# Patient Record
Sex: FEMALE | Race: WHITE | NOT HISPANIC OR LATINO | URBAN - METROPOLITAN AREA
[De-identification: names, ages, dates, MRNs, and addresses within clinical notes are randomized per-mention and may not be internally consistent; named-entity substitution may affect disease eponyms.]

---

## 2019-02-15 ENCOUNTER — APPOINTMENT (RX ONLY)
Dept: URBAN - METROPOLITAN AREA OTHER 9 | Facility: OTHER | Age: 49
Setting detail: DERMATOLOGY
End: 2019-02-15

## 2019-02-15 DIAGNOSIS — D22 MELANOCYTIC NEVI: ICD-10-CM

## 2019-02-15 DIAGNOSIS — D18.0 HEMANGIOMA: ICD-10-CM

## 2019-02-15 DIAGNOSIS — L81.4 OTHER MELANIN HYPERPIGMENTATION: ICD-10-CM

## 2019-02-15 DIAGNOSIS — L21.8 OTHER SEBORRHEIC DERMATITIS: ICD-10-CM

## 2019-02-15 PROBLEM — L30.9 DERMATITIS, UNSPECIFIED: Status: ACTIVE | Noted: 2019-02-15

## 2019-02-15 PROBLEM — D48.5 NEOPLASM OF UNCERTAIN BEHAVIOR OF SKIN: Status: ACTIVE | Noted: 2019-02-15

## 2019-02-15 PROBLEM — D22.5 MELANOCYTIC NEVI OF TRUNK: Status: ACTIVE | Noted: 2019-02-15

## 2019-02-15 PROBLEM — C18.9 MALIGNANT NEOPLASM OF COLON, UNSPECIFIED: Status: ACTIVE | Noted: 2019-02-15

## 2019-02-15 PROCEDURE — 99203 OFFICE O/P NEW LOW 30 MIN: CPT | Mod: 25

## 2019-02-15 PROCEDURE — ? BIOPSY BY SHAVE METHOD

## 2019-02-15 PROCEDURE — 11102 TANGNTL BX SKIN SINGLE LES: CPT

## 2019-02-15 PROCEDURE — ? COUNSELING

## 2019-02-15 PROCEDURE — ? PRESCRIPTION

## 2019-02-15 PROCEDURE — 11103 TANGNTL BX SKIN EA SEP/ADDL: CPT

## 2019-02-15 PROCEDURE — ? OTHER

## 2019-02-15 RX ORDER — CICLOPIROX 10 MG/.96ML
SHAMPOO TOPICAL QOD
Qty: 1 | Refills: 2 | Status: ERX | COMMUNITY
Start: 2019-02-15

## 2019-02-15 RX ORDER — CALCIPOTRIENE AND BETAMETHASONE DIPROPIONATE 50; .5 UG/G; MG/G
SUSPENSION TOPICAL
Qty: 1 | Refills: 2 | Status: ERX | COMMUNITY
Start: 2019-02-15

## 2019-02-15 RX ADMIN — CALCIPOTRIENE AND BETAMETHASONE DIPROPIONATE: 50; .5 SUSPENSION TOPICAL at 00:00

## 2019-02-15 RX ADMIN — CICLOPIROX: 10 SHAMPOO TOPICAL at 00:00

## 2019-02-15 ASSESSMENT — LOCATION DETAILED DESCRIPTION DERM
LOCATION DETAILED: LEFT SUPERIOR MEDIAL UPPER BACK
LOCATION DETAILED: LEFT RIB CAGE
LOCATION DETAILED: RIGHT RIB CAGE
LOCATION DETAILED: RIGHT MEDIAL UPPER BACK
LOCATION DETAILED: LEFT SUPERIOR PARIETAL SCALP

## 2019-02-15 ASSESSMENT — LOCATION SIMPLE DESCRIPTION DERM
LOCATION SIMPLE: LEFT UPPER BACK
LOCATION SIMPLE: RIGHT UPPER BACK
LOCATION SIMPLE: SCALP
LOCATION SIMPLE: ABDOMEN

## 2019-02-15 ASSESSMENT — LOCATION ZONE DERM
LOCATION ZONE: TRUNK
LOCATION ZONE: SCALP

## 2019-02-15 NOTE — PROCEDURE: BIOPSY BY SHAVE METHOD
Billing Type: Third-Party Bill
Anesthesia Volume In Cc: 3
Type Of Destruction Used: Curettage
X Size Of Lesion In Cm: 0
Biopsy Type: H and E
Electrodesiccation And Curettage Text: The wound bed was treated with electrodesiccation and curettage after the biopsy was performed.
Post-Care Instructions: I reviewed with the patient in detail post-care instructions. Patient is to keep the biopsy site dry overnight, and then apply bacitracin twice daily until healed. Patient may apply hydrogen peroxide soaks to remove any crusting.
Silver Nitrate Text: The wound bed was treated with silver nitrate after the biopsy was performed.
Body Location Override (Optional - Billing Will Still Be Based On Selected Body Map Location If Applicable): right upper abdomen
Dressing: bandage
Biopsy Method: Personna blade
Hemostasis: Aluminum Chloride and Thermocautery
Notification Instructions: Patient will be notified of biopsy results. However, patient instructed to call the office if not contacted within 2 weeks.
Was A Bandage Applied: Yes
Bill For Surgical Tray: no
Consent: Written consent was obtained and risks were reviewed including but not limited to scarring, infection, bleeding, scabbing, incomplete removal, nerve damage and allergy to anesthesia.
Curettage Text: The wound bed was treated with curettage after the biopsy was performed
Lab: [id number]
Detail Level: Detailed
Anesthesia Type: 1% lidocaine with 1:200,000 epinephrine
Cryotherapy Text: The wound bed was treated with cryotherapy after the biopsy was performed.
Wound Care: Polysporin ointment
Lab Facility: [id number]
Depth Of Biopsy: dermis
Electrodesiccation Text: The wound bed was treated with electrodesiccation after the biopsy was performed.
Body Location Override (Optional - Billing Will Still Be Based On Selected Body Map Location If Applicable): left upper abdomen

## 2019-02-15 NOTE — PROCEDURE: OTHER
Other (Free Text): Patient has been previously diagnosed with psoriasis. She has never been biopsied for this condition. Explained to patient that it looks more like seb derm but treatments are similar for both diagnoses. Patient states she does not feel like it’s under control at this time. She feels she has gotten the best results with topical steroids. \\n\\nShe states she has also gotten some scales around her eyes and in her ears. Will biopsy at follow up to confirm diagnosis
Detail Level: Simple
Note Text (......Xxx Chief Complaint.): This diagnosis correlates with the

## 2019-04-29 ENCOUNTER — APPOINTMENT (RX ONLY)
Dept: URBAN - METROPOLITAN AREA OTHER 9 | Facility: OTHER | Age: 49
Setting detail: DERMATOLOGY
End: 2019-04-29

## 2019-04-29 DIAGNOSIS — L21.8 OTHER SEBORRHEIC DERMATITIS: ICD-10-CM

## 2019-04-29 DIAGNOSIS — Z87.891 PERSONAL HISTORY OF NICOTINE DEPENDENCE: ICD-10-CM

## 2019-04-29 PROBLEM — E78.5 HYPERLIPIDEMIA, UNSPECIFIED: Status: ACTIVE | Noted: 2019-04-29

## 2019-04-29 PROBLEM — L30.9 DERMATITIS, UNSPECIFIED: Status: ACTIVE | Noted: 2019-04-29

## 2019-04-29 PROBLEM — K21.9 GASTRO-ESOPHAGEAL REFLUX DISEASE WITHOUT ESOPHAGITIS: Status: ACTIVE | Noted: 2019-04-29

## 2019-04-29 PROBLEM — I10 ESSENTIAL (PRIMARY) HYPERTENSION: Status: ACTIVE | Noted: 2019-04-29

## 2019-04-29 PROCEDURE — ? PATIENT SPECIFIC COUNSELING

## 2019-04-29 PROCEDURE — ? DEFER

## 2019-04-29 PROCEDURE — ? NOTED ON EXAM BUT NOT TREATED

## 2019-04-29 PROCEDURE — ? TREATMENT REGIMEN

## 2019-04-29 PROCEDURE — ? PRESCRIPTION

## 2019-04-29 PROCEDURE — 99024 POSTOP FOLLOW-UP VISIT: CPT

## 2019-04-29 PROCEDURE — ? COUNSELING

## 2019-04-29 RX ORDER — FLUOCINONIDE 0.5 MG/ML
SOLUTION TOPICAL
Qty: 2 | Refills: 3 | Status: ERX

## 2019-04-29 ASSESSMENT — LOCATION ZONE DERM: LOCATION ZONE: SCALP

## 2019-04-29 ASSESSMENT — LOCATION SIMPLE DESCRIPTION DERM: LOCATION SIMPLE: SCALP

## 2019-04-29 ASSESSMENT — LOCATION DETAILED DESCRIPTION DERM: LOCATION DETAILED: LEFT SUPERIOR PARIETAL SCALP

## 2019-04-29 NOTE — PROCEDURE: PATIENT SPECIFIC COUNSELING
Shyann reassured the patient she will not charge for today’s visit, and that she will work with the patient’s current financial status by not charging the patient for the recommended scalp biopsy. This way the patient will only pay for a 94978 office visit code and the pathology charge. The patient verbalized understanding to all discussed.
Detail Level: Simple

## 2019-04-29 NOTE — PROCEDURE: TREATMENT REGIMEN
Initiate Treatment: Fluocinonide solution
Plan: The patient is to apply Fluocinonide solution to affected areas on her scalp twice daily for 2 weeks, then as needed for flares.
Discontinue Regimen: Loprox shampoo, Taclonex
Detail Level: Simple

## 2019-04-29 NOTE — PROCEDURE: DEFER
Procedure To Be Performed At Next Visit: Biopsy by punch method
Detail Level: Zone
Introduction Text (Please End With A Colon): The following procedure was deferred:

## 2019-06-13 RX ORDER — FLUOCINONIDE 0.5 MG/ML
SOLUTION TOPICAL
Qty: 2 | Refills: 3 | Status: ERX

## 2020-06-25 ENCOUNTER — OFFICE VISIT (OUTPATIENT)
Dept: URBAN - METROPOLITAN AREA CLINIC 96 | Facility: CLINIC | Age: 50
End: 2020-06-25
Payer: COMMERCIAL

## 2020-06-25 DIAGNOSIS — K76.0 FATTY LIVER: ICD-10-CM

## 2020-06-25 DIAGNOSIS — Z85.038 PERSONAL HISTORY OF COLON CANCER: ICD-10-CM

## 2020-06-25 DIAGNOSIS — K92.1 HEMATOCHEZIA: ICD-10-CM

## 2020-06-25 PROCEDURE — 99204 OFFICE O/P NEW MOD 45 MIN: CPT | Performed by: INTERNAL MEDICINE

## 2020-06-25 PROCEDURE — 99244 OFF/OP CNSLTJ NEW/EST MOD 40: CPT | Performed by: INTERNAL MEDICINE

## 2020-06-25 RX ORDER — AMLODIPINE BESYLATE 2.5 MG/1
1 TABLET TABLET ORAL ONCE A DAY
Status: ACTIVE | COMMUNITY

## 2020-06-25 RX ORDER — METFORMIN HYDROCHLORIDE 1000 MG/1
1 TABLET WITH A MEAL TABLET, FILM COATED ORAL ONCE A DAY
Status: ACTIVE | COMMUNITY

## 2020-06-25 RX ORDER — SODIUM, POTASSIUM,MAG SULFATES 17.5-3.13G
177ML SOLUTION, RECONSTITUTED, ORAL ORAL
Qty: 177 MILLILITER | Refills: 0 | OUTPATIENT
Start: 2020-06-25 | End: 2020-06-26

## 2020-06-25 RX ORDER — EMPAGLIFLOZIN 25 MG/1
1 TABLET TABLET, FILM COATED ORAL ONCE A DAY
Status: ACTIVE | COMMUNITY

## 2020-06-25 NOTE — HPI-TODAY'S VISIT:
here for colonoscopy had one at [place] with Dr. Spence in 2018, normal. 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 Montesinos. no issues since then. three weeks ago developed hematochezia for a few days is a vet hx of FLD. rare etoh use occ diarrhea since her resection

## 2020-08-05 ENCOUNTER — TELEPHONE ENCOUNTER (OUTPATIENT)
Dept: URBAN - METROPOLITAN AREA CLINIC 92 | Facility: CLINIC | Age: 50
End: 2020-08-05

## 2020-08-05 ENCOUNTER — OFFICE VISIT (OUTPATIENT)
Dept: URBAN - METROPOLITAN AREA SURGERY CENTER 18 | Facility: SURGERY CENTER | Age: 50
End: 2020-08-05
Payer: COMMERCIAL

## 2020-08-05 DIAGNOSIS — D12.3 ADENOMA OF TRANSVERSE COLON: ICD-10-CM

## 2020-08-05 DIAGNOSIS — K63.89 BACTERIAL OVERGROWTH SYNDROME: ICD-10-CM

## 2020-08-05 DIAGNOSIS — Z85.038 PERSONAL HISTORY OF COLON CANCER: ICD-10-CM

## 2020-08-05 PROCEDURE — G8907 PT DOC NO EVENTS ON DISCHARG: HCPCS | Performed by: INTERNAL MEDICINE

## 2020-08-05 PROCEDURE — G9936 PMH PLYP/NEO CO/RECT/JUN/ANS: HCPCS | Performed by: INTERNAL MEDICINE

## 2020-08-05 PROCEDURE — 45380 COLONOSCOPY AND BIOPSY: CPT | Performed by: INTERNAL MEDICINE

## 2020-08-05 RX ORDER — AMLODIPINE BESYLATE 2.5 MG/1
1 TABLET TABLET ORAL ONCE A DAY
Status: ACTIVE | COMMUNITY

## 2020-08-05 RX ORDER — METFORMIN HYDROCHLORIDE 1000 MG/1
1 TABLET WITH A MEAL TABLET, FILM COATED ORAL ONCE A DAY
Status: ACTIVE | COMMUNITY

## 2020-08-05 RX ORDER — EMPAGLIFLOZIN 25 MG/1
1 TABLET TABLET, FILM COATED ORAL ONCE A DAY
Status: ACTIVE | COMMUNITY

## 2020-08-28 ENCOUNTER — OFFICE VISIT (OUTPATIENT)
Dept: URBAN - METROPOLITAN AREA TELEHEALTH 2 | Facility: TELEHEALTH | Age: 50
End: 2020-08-28

## 2020-08-28 ENCOUNTER — TELEPHONE ENCOUNTER (OUTPATIENT)
Dept: URBAN - METROPOLITAN AREA CLINIC 92 | Facility: CLINIC | Age: 50
End: 2020-08-28

## 2020-08-28 RX ORDER — METFORMIN HYDROCHLORIDE 1000 MG/1
1 TABLET WITH A MEAL TABLET, FILM COATED ORAL ONCE A DAY
Status: ACTIVE | COMMUNITY

## 2020-08-28 RX ORDER — EMPAGLIFLOZIN 25 MG/1
1 TABLET TABLET, FILM COATED ORAL ONCE A DAY
Status: ACTIVE | COMMUNITY

## 2020-08-28 RX ORDER — AMLODIPINE BESYLATE 2.5 MG/1
1 TABLET TABLET ORAL ONCE A DAY
Status: ACTIVE | COMMUNITY

## 2022-01-25 ENCOUNTER — TELEPHONE ENCOUNTER (OUTPATIENT)
Dept: URBAN - METROPOLITAN AREA CLINIC 50 | Facility: CLINIC | Age: 52
End: 2022-01-25

## 2022-04-19 ENCOUNTER — WEB ENCOUNTER (OUTPATIENT)
Dept: URBAN - METROPOLITAN AREA CLINIC 50 | Facility: CLINIC | Age: 52
End: 2022-04-19

## 2022-04-19 ENCOUNTER — OFFICE VISIT (OUTPATIENT)
Dept: URBAN - METROPOLITAN AREA CLINIC 50 | Facility: CLINIC | Age: 52
End: 2022-04-19
Payer: COMMERCIAL

## 2022-04-19 VITALS
DIASTOLIC BLOOD PRESSURE: 86 MMHG | TEMPERATURE: 97.4 F | BODY MASS INDEX: 37.73 KG/M2 | SYSTOLIC BLOOD PRESSURE: 130 MMHG | HEIGHT: 62 IN | HEART RATE: 95 BPM | WEIGHT: 205 LBS

## 2022-04-19 DIAGNOSIS — R16.1 SPLENOMEGALY: ICD-10-CM

## 2022-04-19 DIAGNOSIS — R93.3 ABNORMAL COLONOSCOPY: ICD-10-CM

## 2022-04-19 DIAGNOSIS — R93.2 ABNORMAL CT OF LIVER: ICD-10-CM

## 2022-04-19 DIAGNOSIS — K86.2 PANCREAS CYST: ICD-10-CM

## 2022-04-19 DIAGNOSIS — Z85.038 HISTORY OF COLON CANCER: ICD-10-CM

## 2022-04-19 PROCEDURE — 99214 OFFICE O/P EST MOD 30 MIN: CPT | Performed by: INTERNAL MEDICINE

## 2022-04-19 RX ORDER — AMLODIPINE BESYLATE 2.5 MG/1
1 TABLET TABLET ORAL ONCE A DAY
Status: ACTIVE | COMMUNITY

## 2022-04-19 RX ORDER — SODIUM SULFATE, MAGNESIUM SULFATE, AND POTASSIUM CHLORIDE 17.75; 2.7; 2.25 G/1; G/1; G/1
12 TABLETS TABLET ORAL
Qty: 24 TABLETS | Refills: 0 | OUTPATIENT
Start: 2022-04-19 | End: 2022-04-20

## 2022-04-19 RX ORDER — METFORMIN HYDROCHLORIDE 1000 MG/1
1 TABLET WITH A MEAL TABLET, FILM COATED ORAL ONCE A DAY
Status: ACTIVE | COMMUNITY

## 2022-04-19 RX ORDER — EMPAGLIFLOZIN 25 MG/1
1 TABLET TABLET, FILM COATED ORAL ONCE A DAY
Status: ACTIVE | COMMUNITY

## 2022-04-19 NOTE — HPI-TODAY'S VISIT:
had a colonoscopy 8/2020 with 5 mm tubular adenoma and submucosal nodule noted in cecum, bx negative follow up CT scan showed steatosis, exophytic fibroid, gallstones follow up MRI showed 7 mm panc cyst, advised repeat in 12 months  =================================== here for colonoscopy had one at [facility] with Dr. Spence in 2018, normal. 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 Montesinos. no issues since then. three weeks ago developed hematochezia for a few days is a vet hx of FLD. rare etoh use occ diarrhea since her resection

## 2022-05-13 ENCOUNTER — LAB OUTSIDE AN ENCOUNTER (OUTPATIENT)
Dept: URBAN - METROPOLITAN AREA CLINIC 96 | Facility: CLINIC | Age: 52
End: 2022-05-13

## 2022-05-13 LAB
CREATININE POC: 0.7
PERFORMING LAB: (no result)

## 2022-06-22 PROBLEM — 429699009: Status: ACTIVE | Noted: 2022-04-19

## 2022-06-24 ENCOUNTER — OFFICE VISIT (OUTPATIENT)
Dept: URBAN - METROPOLITAN AREA SURGERY CENTER 19 | Facility: SURGERY CENTER | Age: 52
End: 2022-06-24

## 2022-07-27 ENCOUNTER — OFFICE VISIT (OUTPATIENT)
Dept: URBAN - METROPOLITAN AREA SURGERY CENTER 18 | Facility: SURGERY CENTER | Age: 52
End: 2022-07-27
Payer: COMMERCIAL

## 2022-07-27 ENCOUNTER — CLAIMS CREATED FROM THE CLAIM WINDOW (OUTPATIENT)
Dept: URBAN - METROPOLITAN AREA CLINIC 4 | Facility: CLINIC | Age: 52
End: 2022-07-27
Payer: COMMERCIAL

## 2022-07-27 DIAGNOSIS — D12.3 BENIGN NEOPLASM OF TRANSVERSE COLON: ICD-10-CM

## 2022-07-27 DIAGNOSIS — D12.8 BENIGN NEOPLASM OF RECTUM: ICD-10-CM

## 2022-07-27 DIAGNOSIS — D12.5 ADENOMA OF SIGMOID COLON: ICD-10-CM

## 2022-07-27 DIAGNOSIS — D12.5 BENIGN NEOPLASM OF SIGMOID COLON: ICD-10-CM

## 2022-07-27 DIAGNOSIS — D12.3 ADENOMA OF TRANSVERSE COLON: ICD-10-CM

## 2022-07-27 DIAGNOSIS — Z85.038 H/O COLON CANCER, STAGE I: ICD-10-CM

## 2022-07-27 DIAGNOSIS — D12.8 ADENOMATOUS POLYP OF RECTUM: ICD-10-CM

## 2022-07-27 PROCEDURE — G8907 PT DOC NO EVENTS ON DISCHARG: HCPCS | Performed by: INTERNAL MEDICINE

## 2022-07-27 PROCEDURE — 45385 COLONOSCOPY W/LESION REMOVAL: CPT | Performed by: INTERNAL MEDICINE

## 2022-07-27 PROCEDURE — 88305 TISSUE EXAM BY PATHOLOGIST: CPT | Performed by: PATHOLOGY

## 2022-07-27 RX ORDER — METFORMIN HYDROCHLORIDE 1000 MG/1
1 TABLET WITH A MEAL TABLET, FILM COATED ORAL ONCE A DAY
Status: ACTIVE | COMMUNITY

## 2022-07-27 RX ORDER — EMPAGLIFLOZIN 25 MG/1
1 TABLET TABLET, FILM COATED ORAL ONCE A DAY
Status: ACTIVE | COMMUNITY

## 2022-07-27 RX ORDER — AMLODIPINE BESYLATE 2.5 MG/1
1 TABLET TABLET ORAL ONCE A DAY
Status: ACTIVE | COMMUNITY

## 2023-05-16 ENCOUNTER — OFFICE VISIT (OUTPATIENT)
Dept: URBAN - METROPOLITAN AREA CLINIC 50 | Facility: CLINIC | Age: 53
End: 2023-05-16
Payer: COMMERCIAL

## 2023-05-16 ENCOUNTER — LAB OUTSIDE AN ENCOUNTER (OUTPATIENT)
Dept: URBAN - METROPOLITAN AREA CLINIC 50 | Facility: CLINIC | Age: 53
End: 2023-05-16

## 2023-05-16 ENCOUNTER — WEB ENCOUNTER (OUTPATIENT)
Dept: URBAN - METROPOLITAN AREA CLINIC 50 | Facility: CLINIC | Age: 53
End: 2023-05-16

## 2023-05-16 VITALS
WEIGHT: 209 LBS | SYSTOLIC BLOOD PRESSURE: 130 MMHG | BODY MASS INDEX: 38.46 KG/M2 | HEART RATE: 79 BPM | DIASTOLIC BLOOD PRESSURE: 86 MMHG | HEIGHT: 62 IN | TEMPERATURE: 97.3 F

## 2023-05-16 DIAGNOSIS — K76.0 FATTY LIVER: ICD-10-CM

## 2023-05-16 DIAGNOSIS — Z85.038 HISTORY OF COLON CANCER: ICD-10-CM

## 2023-05-16 DIAGNOSIS — R93.2 ABNORMAL CT OF LIVER: ICD-10-CM

## 2023-05-16 DIAGNOSIS — R16.1 SPLENOMEGALY: ICD-10-CM

## 2023-05-16 PROCEDURE — 99214 OFFICE O/P EST MOD 30 MIN: CPT | Performed by: INTERNAL MEDICINE

## 2023-05-16 RX ORDER — SODIUM, POTASSIUM,MAG SULFATES 17.5-3.13G
354 ML SOLUTION, RECONSTITUTED, ORAL ORAL ONCE
Qty: 1 | Refills: 0 | OUTPATIENT
Start: 2023-05-17

## 2023-05-16 RX ORDER — AMLODIPINE BESYLATE 2.5 MG/1
1 TABLET TABLET ORAL ONCE A DAY
Status: ACTIVE | COMMUNITY

## 2023-05-16 RX ORDER — EMPAGLIFLOZIN 25 MG/1
1 TABLET TABLET, FILM COATED ORAL ONCE A DAY
Status: ACTIVE | COMMUNITY

## 2023-05-16 RX ORDER — METFORMIN HYDROCHLORIDE 1000 MG/1
1 TABLET WITH A MEAL TABLET, FILM COATED ORAL ONCE A DAY
Status: ACTIVE | COMMUNITY

## 2023-05-16 NOTE — HPI-TODAY'S VISIT:
colonoscopy 2022 with m ultiple tubular adenomas, largest 15 mm. has psoriasis dermatologist has had her on topicals for years affects her life all on her scalp. derm is [facility], first visit, saw an NP skyrizi or tremfiya  ========================================= had a colonoscopy 8/2020 with 5 mm tubular adenoma and submucosal nodule noted in cecum, bx negative follow up CT scan showed steatosis, exophytic fibroid, gallstones follow up MRI showed 7 mm panc cyst, advised repeat in 12 months  =================================== here for colonoscopy had one at [place] with Dr. Spence in 2018, normal. 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 Montesinos. is a vet hx of FLD. rare etoh use occ diarrhea since her resection here for colonoscopy

## 2023-06-15 ENCOUNTER — TELEPHONE ENCOUNTER (OUTPATIENT)
Dept: URBAN - METROPOLITAN AREA CLINIC 96 | Facility: CLINIC | Age: 53
End: 2023-06-15

## 2023-08-04 ENCOUNTER — TELEPHONE ENCOUNTER (OUTPATIENT)
Dept: URBAN - METROPOLITAN AREA CLINIC 50 | Facility: CLINIC | Age: 53
End: 2023-08-04

## 2023-08-10 ENCOUNTER — WEB ENCOUNTER (OUTPATIENT)
Dept: URBAN - METROPOLITAN AREA SURGERY CENTER 19 | Facility: SURGERY CENTER | Age: 53
End: 2023-08-10

## 2023-08-11 ENCOUNTER — CLAIMS CREATED FROM THE CLAIM WINDOW (OUTPATIENT)
Dept: URBAN - METROPOLITAN AREA CLINIC 4 | Facility: CLINIC | Age: 53
End: 2023-08-11
Payer: COMMERCIAL

## 2023-08-11 ENCOUNTER — OFFICE VISIT (OUTPATIENT)
Dept: URBAN - METROPOLITAN AREA SURGERY CENTER 19 | Facility: SURGERY CENTER | Age: 53
End: 2023-08-11
Payer: COMMERCIAL

## 2023-08-11 ENCOUNTER — TELEPHONE ENCOUNTER (OUTPATIENT)
Dept: URBAN - METROPOLITAN AREA CLINIC 96 | Facility: CLINIC | Age: 53
End: 2023-08-11

## 2023-08-11 DIAGNOSIS — D12.2 BENIGN NEOPLASM OF ASCENDING COLON: ICD-10-CM

## 2023-08-11 DIAGNOSIS — D12.2 ADENOMA OF ASCENDING COLON: ICD-10-CM

## 2023-08-11 DIAGNOSIS — D12.5 ADENOMA OF SIGMOID COLON: ICD-10-CM

## 2023-08-11 DIAGNOSIS — Z85.038 H/O COLON CANCER, STAGE I: ICD-10-CM

## 2023-08-11 PROCEDURE — 88305 TISSUE EXAM BY PATHOLOGIST: CPT | Performed by: PATHOLOGY

## 2023-08-11 PROCEDURE — 45385 COLONOSCOPY W/LESION REMOVAL: CPT | Performed by: INTERNAL MEDICINE

## 2023-08-11 PROCEDURE — G8907 PT DOC NO EVENTS ON DISCHARG: HCPCS | Performed by: INTERNAL MEDICINE

## 2023-08-11 RX ORDER — EMPAGLIFLOZIN 25 MG/1
1 TABLET TABLET, FILM COATED ORAL ONCE A DAY
Status: ACTIVE | COMMUNITY

## 2023-08-11 RX ORDER — SODIUM, POTASSIUM,MAG SULFATES 17.5-3.13G
354 ML SOLUTION, RECONSTITUTED, ORAL ORAL ONCE
Qty: 1 | Refills: 0 | Status: ACTIVE | COMMUNITY
Start: 2023-05-17

## 2023-08-11 RX ORDER — METFORMIN HYDROCHLORIDE 1000 MG/1
1 TABLET WITH A MEAL TABLET, FILM COATED ORAL ONCE A DAY
Status: ACTIVE | COMMUNITY

## 2023-08-11 RX ORDER — AMLODIPINE BESYLATE 2.5 MG/1
1 TABLET TABLET ORAL ONCE A DAY
Status: ACTIVE | COMMUNITY

## 2023-09-15 ENCOUNTER — OFFICE VISIT (OUTPATIENT)
Dept: URBAN - METROPOLITAN AREA CLINIC 98 | Facility: CLINIC | Age: 53
End: 2023-09-15
Payer: COMMERCIAL

## 2023-09-15 ENCOUNTER — WEB ENCOUNTER (OUTPATIENT)
Dept: URBAN - METROPOLITAN AREA CLINIC 98 | Facility: CLINIC | Age: 53
End: 2023-09-15

## 2023-09-15 VITALS
DIASTOLIC BLOOD PRESSURE: 74 MMHG | BODY MASS INDEX: 36.73 KG/M2 | TEMPERATURE: 98.2 F | WEIGHT: 199.6 LBS | HEIGHT: 62 IN | HEART RATE: 102 BPM | SYSTOLIC BLOOD PRESSURE: 118 MMHG

## 2023-09-15 DIAGNOSIS — Z87.2 HISTORY OF PSORIASIS: ICD-10-CM

## 2023-09-15 DIAGNOSIS — K86.2 PANCREAS CYST: ICD-10-CM

## 2023-09-15 DIAGNOSIS — K76.0 FATTY (CHANGE OF) LIVER, NOT ELSEWHERE CLASSIFIED: ICD-10-CM

## 2023-09-15 PROBLEM — 197321007: Status: ACTIVE | Noted: 2023-09-15

## 2023-09-15 PROCEDURE — 99214 OFFICE O/P EST MOD 30 MIN: CPT | Performed by: INTERNAL MEDICINE

## 2023-09-15 RX ORDER — TIRZEPATIDE 7.5 MG/.5ML
AS DIRECTED INJECTION, SOLUTION SUBCUTANEOUS
Status: ACTIVE | COMMUNITY

## 2023-09-15 RX ORDER — EMPAGLIFLOZIN 25 MG/1
1 TABLET TABLET, FILM COATED ORAL ONCE A DAY
Status: ACTIVE | COMMUNITY

## 2023-09-15 RX ORDER — METFORMIN HYDROCHLORIDE 1000 MG/1
1 TABLET WITH A MEAL TABLET, FILM COATED ORAL ONCE A DAY
Status: ACTIVE | COMMUNITY

## 2023-09-15 RX ORDER — AMLODIPINE BESYLATE 2.5 MG/1
1 TABLET TABLET ORAL ONCE A DAY
Status: ACTIVE | COMMUNITY

## 2023-09-15 NOTE — HPI-TODAY'S VISIT:
pt of Dr Evelyn Velez w CRC and psoriasis on scalp also known steatotic liver disease  here today bc Dr Velez sent her over bc high liver tests  wanted to start on Skyrizi or Tremfya - currently on topical (dx in HS).  high liver tests x 5-6 years  (stable) peak wt 235  . 2022 alt 77 ast 51 alp 101  6/2023 alt 47 ast 32 alp 124 tb karoline 0.5    [facility]RI pancreas 2022 : NORTHSIDE   IMPRESSION: Cholelithiasis without acute cholecystitis.          No enhancing pancreas mass. Anterior high T2 4 mm area at the pancreas         head could be a small cyst or branch duct lesion. Consider 6-12 month         follow-up.   Derm : Betty BAHENA

## 2023-09-16 ENCOUNTER — DASHBOARD ENCOUNTERS (OUTPATIENT)
Age: 53
End: 2023-09-16

## 2023-09-16 ENCOUNTER — TELEPHONE ENCOUNTER (OUTPATIENT)
Dept: URBAN - METROPOLITAN AREA CLINIC 98 | Facility: CLINIC | Age: 53
End: 2023-09-16

## 2025-01-28 ENCOUNTER — TELEPHONE ENCOUNTER (OUTPATIENT)
Dept: URBAN - METROPOLITAN AREA CLINIC 96 | Facility: CLINIC | Age: 55
End: 2025-01-28

## 2025-01-28 ENCOUNTER — OFFICE VISIT (OUTPATIENT)
Dept: URBAN - METROPOLITAN AREA TELEHEALTH 2 | Facility: TELEHEALTH | Age: 55
End: 2025-01-28
Payer: COMMERCIAL

## 2025-01-28 ENCOUNTER — OFFICE VISIT (OUTPATIENT)
Dept: URBAN - METROPOLITAN AREA TELEHEALTH 2 | Facility: TELEHEALTH | Age: 55
End: 2025-01-28

## 2025-01-28 VITALS — HEIGHT: 62 IN | BODY MASS INDEX: 30.36 KG/M2 | WEIGHT: 165 LBS

## 2025-01-28 DIAGNOSIS — Z85.038 PERSONAL HISTORY OF COLON CANCER: ICD-10-CM

## 2025-01-28 DIAGNOSIS — R16.1 SPLENOMEGALY: ICD-10-CM

## 2025-01-28 DIAGNOSIS — K76.0 FATTY LIVER: ICD-10-CM

## 2025-01-28 DIAGNOSIS — E66.09 EXOGENOUS OBESITY: ICD-10-CM

## 2025-01-28 DIAGNOSIS — K86.2 PANCREAS CYST: ICD-10-CM

## 2025-01-28 DIAGNOSIS — R93.3 ABNORMAL COLONOSCOPY: ICD-10-CM

## 2025-01-28 DIAGNOSIS — K76.0 FATTY (CHANGE OF) LIVER, NOT ELSEWHERE CLASSIFIED: ICD-10-CM

## 2025-01-28 DIAGNOSIS — Z15.89 CHEK2 POSITIVE: ICD-10-CM

## 2025-01-28 DIAGNOSIS — Z85.038 HISTORY OF COLON CANCER: ICD-10-CM

## 2025-01-28 DIAGNOSIS — Z87.2 HISTORY OF PSORIASIS: ICD-10-CM

## 2025-01-28 DIAGNOSIS — K63.9 NODULE OF COLON: ICD-10-CM

## 2025-01-28 DIAGNOSIS — R93.2 ABNORMAL CT OF LIVER: ICD-10-CM

## 2025-01-28 PROCEDURE — 99213 OFFICE O/P EST LOW 20 MIN: CPT | Performed by: INTERNAL MEDICINE

## 2025-01-28 RX ORDER — TIRZEPATIDE 7.5 MG/.5ML
AS DIRECTED INJECTION, SOLUTION SUBCUTANEOUS
Status: ACTIVE | COMMUNITY

## 2025-01-28 RX ORDER — METFORMIN HYDROCHLORIDE 1000 MG/1
1 TABLET WITH A MEAL TABLET, FILM COATED ORAL ONCE A DAY
Status: ACTIVE | COMMUNITY

## 2025-01-28 RX ORDER — EMPAGLIFLOZIN 25 MG/1
1 TABLET TABLET, FILM COATED ORAL ONCE A DAY
Status: ON HOLD | COMMUNITY

## 2025-01-28 RX ORDER — AMLODIPINE BESYLATE 2.5 MG/1
1 TABLET TABLET ORAL ONCE A DAY
Status: ON HOLD | COMMUNITY

## 2025-01-28 RX ORDER — SOD SULF/POT CHLORIDE/MAG SULF 1.479 G
12 TABLETS THE FIRST DOSE THE EVENING BEFORE AND SECOND DOSE THE MORNING OF COLONOSCOPY TABLET ORAL TWICE A DAY
Qty: 24 | OUTPATIENT
Start: 2025-01-28 | End: 2025-01-29

## 2025-01-28 NOTE — HPI-TODAY'S VISIT:
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 Montesinos.

## 2025-02-10 ENCOUNTER — OFFICE VISIT (OUTPATIENT)
Dept: URBAN - METROPOLITAN AREA SURGERY CENTER 18 | Facility: SURGERY CENTER | Age: 55
End: 2025-02-10

## 2025-03-12 ENCOUNTER — CLAIMS CREATED FROM THE CLAIM WINDOW (OUTPATIENT)
Dept: URBAN - METROPOLITAN AREA CLINIC 4 | Facility: CLINIC | Age: 55
End: 2025-03-12
Payer: COMMERCIAL

## 2025-03-12 ENCOUNTER — TELEPHONE ENCOUNTER (OUTPATIENT)
Dept: URBAN - METROPOLITAN AREA CLINIC 96 | Facility: CLINIC | Age: 55
End: 2025-03-12

## 2025-03-12 ENCOUNTER — OFFICE VISIT (OUTPATIENT)
Dept: URBAN - METROPOLITAN AREA SURGERY CENTER 18 | Facility: SURGERY CENTER | Age: 55
End: 2025-03-12
Payer: COMMERCIAL

## 2025-03-12 DIAGNOSIS — Z86.0100 PERSONAL HISTORY OF COLONIC POLYPS: ICD-10-CM

## 2025-03-12 DIAGNOSIS — Z85.038 ADENOCARCINOMA, COLON, HX OF: ICD-10-CM

## 2025-03-12 DIAGNOSIS — D12.2 ADENOMATOUS POLYP OF ASCENDING COLON: ICD-10-CM

## 2025-03-12 DIAGNOSIS — D12.2 BENIGN NEOPLASM OF ASCENDING COLON: ICD-10-CM

## 2025-03-12 DIAGNOSIS — D12.5 ADENOMATOUS POLYP OF SIGMOID COLON: ICD-10-CM

## 2025-03-12 DIAGNOSIS — D12.2 ADENOMA OF ASCENDING COLON: ICD-10-CM

## 2025-03-12 DIAGNOSIS — Z12.11 COLON CANCER SCREENING (HIGH RISK): ICD-10-CM

## 2025-03-12 DIAGNOSIS — Z86.0100 HISTORY OF COLON POLYPS: ICD-10-CM

## 2025-03-12 DIAGNOSIS — K63.89 BACTERIAL OVERGROWTH SYNDROME: ICD-10-CM

## 2025-03-12 DIAGNOSIS — K63.89 OTHER SPECIFIED DISEASES OF INTESTINE: ICD-10-CM

## 2025-03-12 DIAGNOSIS — Z85.038 PERSONAL HISTORY OF COLON CANCER: ICD-10-CM

## 2025-03-12 PROCEDURE — 88305 TISSUE EXAM BY PATHOLOGIST: CPT | Performed by: PATHOLOGY

## 2025-03-12 PROCEDURE — 00812 ANES LWR INTST SCR COLSC: CPT | Performed by: NURSE ANESTHETIST, CERTIFIED REGISTERED

## 2025-03-12 PROCEDURE — 45385 COLONOSCOPY W/LESION REMOVAL: CPT | Performed by: INTERNAL MEDICINE

## 2025-03-12 RX ORDER — AMLODIPINE BESYLATE 2.5 MG/1
1 TABLET TABLET ORAL ONCE A DAY
Status: ON HOLD | COMMUNITY

## 2025-03-12 RX ORDER — METFORMIN HYDROCHLORIDE 1000 MG/1
1 TABLET WITH A MEAL TABLET, FILM COATED ORAL ONCE A DAY
Status: ACTIVE | COMMUNITY

## 2025-03-12 RX ORDER — EMPAGLIFLOZIN 25 MG/1
1 TABLET TABLET, FILM COATED ORAL ONCE A DAY
Status: ON HOLD | COMMUNITY

## 2025-03-12 RX ORDER — TIRZEPATIDE 7.5 MG/.5ML
AS DIRECTED INJECTION, SOLUTION SUBCUTANEOUS
Status: ACTIVE | COMMUNITY

## 2025-03-17 ENCOUNTER — OFFICE VISIT (OUTPATIENT)
Dept: URBAN - METROPOLITAN AREA CLINIC 98 | Facility: CLINIC | Age: 55
End: 2025-03-17

## 2025-03-25 ENCOUNTER — LAB OUTSIDE AN ENCOUNTER (OUTPATIENT)
Dept: URBAN - METROPOLITAN AREA CLINIC 96 | Facility: CLINIC | Age: 55
End: 2025-03-25

## 2025-03-25 LAB
CREATININE POC: 0.8
PERFORMING LAB: (no result)

## 2025-03-31 ENCOUNTER — OFFICE VISIT (OUTPATIENT)
Dept: URBAN - METROPOLITAN AREA CLINIC 98 | Facility: CLINIC | Age: 55
End: 2025-03-31

## 2025-03-31 RX ORDER — TIRZEPATIDE 7.5 MG/.5ML
AS DIRECTED INJECTION, SOLUTION SUBCUTANEOUS
Status: ACTIVE | COMMUNITY

## 2025-03-31 RX ORDER — METFORMIN HYDROCHLORIDE 1000 MG/1
1 TABLET WITH A MEAL TABLET, FILM COATED ORAL ONCE A DAY
Status: ACTIVE | COMMUNITY

## 2025-03-31 RX ORDER — EMPAGLIFLOZIN 25 MG/1
1 TABLET TABLET, FILM COATED ORAL ONCE A DAY
Status: ON HOLD | COMMUNITY

## 2025-03-31 RX ORDER — AMLODIPINE BESYLATE 2.5 MG/1
1 TABLET TABLET ORAL ONCE A DAY
Status: ON HOLD | COMMUNITY

## 2025-03-31 NOTE — HPI-TODAY'S VISIT:
Last seen by myself for MAFL 2023 Comanaged with DR Velez  Had CT 3/2025    ABDOMEN:        1.1 cm hyperenhancing focus in the superior left lateral segment (series 2 image 25) likely small vascular shunt or perfusion anomaly. No suspicious hepatic lesions. Portal vein patent. At least one gallstone without acute cholecystitis or biliary dilatation. Pancreas unremarkable. Spleen normal in size with couple tiny calcified granulomas. Couple small right adrenal nodules with loss of signal on previous MRI consistent with benign adenomas. Few subcentimeter hypodensities in the kidneys too        small to characterize but statistically benign, no follow-up required. No hydronephrosis. . Charotiana started in Dec but not working ; Mounjaro started > 1 year ago  lost 45 lbs : a1c < 6 now  doing labs regularly : liver tests had normalized  did genetic testing [id number]